# Patient Record
Sex: FEMALE | Race: WHITE | NOT HISPANIC OR LATINO | Employment: OTHER | URBAN - NONMETROPOLITAN AREA
[De-identification: names, ages, dates, MRNs, and addresses within clinical notes are randomized per-mention and may not be internally consistent; named-entity substitution may affect disease eponyms.]

---

## 2018-04-12 ENCOUNTER — HISTORICAL (OUTPATIENT)
Dept: ADMINISTRATIVE | Facility: HOSPITAL | Age: 63
End: 2018-04-12

## 2018-08-13 ENCOUNTER — HISTORICAL (OUTPATIENT)
Dept: ADMINISTRATIVE | Facility: HOSPITAL | Age: 63
End: 2018-08-13

## 2021-05-07 ENCOUNTER — HISTORICAL (OUTPATIENT)
Dept: ADMINISTRATIVE | Facility: HOSPITAL | Age: 66
End: 2021-05-07

## 2021-07-22 ENCOUNTER — HISTORICAL (OUTPATIENT)
Dept: ADMINISTRATIVE | Facility: HOSPITAL | Age: 66
End: 2021-07-22

## 2022-04-07 ENCOUNTER — HISTORICAL (OUTPATIENT)
Dept: ADMINISTRATIVE | Facility: HOSPITAL | Age: 67
End: 2022-04-07

## 2022-04-24 VITALS
DIASTOLIC BLOOD PRESSURE: 76 MMHG | HEIGHT: 63 IN | SYSTOLIC BLOOD PRESSURE: 138 MMHG | BODY MASS INDEX: 44.14 KG/M2 | WEIGHT: 249.13 LBS | OXYGEN SATURATION: 96 %

## 2022-07-27 ENCOUNTER — HISTORICAL (OUTPATIENT)
Dept: ADMINISTRATIVE | Facility: HOSPITAL | Age: 67
End: 2022-07-27

## 2022-12-30 ENCOUNTER — HISTORICAL (OUTPATIENT)
Dept: ADMINISTRATIVE | Facility: HOSPITAL | Age: 67
End: 2022-12-30

## 2023-07-25 ENCOUNTER — HOSPITAL ENCOUNTER (EMERGENCY)
Facility: HOSPITAL | Age: 68
Discharge: HOME | End: 2023-07-25
Attending: FAMILY MEDICINE
Payer: MEDICARE

## 2023-07-25 VITALS
HEART RATE: 63 BPM | RESPIRATION RATE: 18 BRPM | DIASTOLIC BLOOD PRESSURE: 85 MMHG | TEMPERATURE: 97 F | HEIGHT: 62 IN | BODY MASS INDEX: 45.56 KG/M2 | OXYGEN SATURATION: 98 % | SYSTOLIC BLOOD PRESSURE: 218 MMHG

## 2023-07-25 DIAGNOSIS — S90.02XA CONTUSION OF LEFT ANKLE, INITIAL ENCOUNTER: Primary | ICD-10-CM

## 2023-07-25 DIAGNOSIS — M25.572 ANKLE PAIN, LEFT: ICD-10-CM

## 2023-07-25 PROCEDURE — 63600175 PHARM REV CODE 636 W HCPCS: Performed by: FAMILY MEDICINE

## 2023-07-25 PROCEDURE — 99284 EMERGENCY DEPT VISIT MOD MDM: CPT

## 2023-07-25 PROCEDURE — 96372 THER/PROPH/DIAG INJ SC/IM: CPT | Performed by: FAMILY MEDICINE

## 2023-07-25 RX ORDER — KETOROLAC TROMETHAMINE 30 MG/ML
30 INJECTION, SOLUTION INTRAMUSCULAR; INTRAVENOUS
Status: COMPLETED | OUTPATIENT
Start: 2023-07-25 | End: 2023-07-25

## 2023-07-25 RX ORDER — MELOXICAM 7.5 MG/1
7.5 TABLET ORAL DAILY
Qty: 10 TABLET | Refills: 0 | Status: SHIPPED | OUTPATIENT
Start: 2023-07-25 | End: 2023-08-04

## 2023-07-25 RX ADMIN — KETOROLAC TROMETHAMINE 30 MG: 30 INJECTION, SOLUTION INTRAMUSCULAR; INTRAVENOUS at 09:07

## 2023-07-25 NOTE — ED NOTES
PT SAID SHE REMEMBERED THAT HER NP SAID THAT SHE HAS GOUT BUT SHE WAS NEVER GIVEN ANY MEDICATION FOR IT. SHE ALSO SAID THAT SHE SLEEPS IN A DAY BED AND MAYBE SHE HIT HER LEFT FOOT ON THE BED AT NIGHT WHILE SHE WAS SLEEPING. SHE DOES HAVE ARTHRITIS.

## 2023-07-25 NOTE — ED PROVIDER NOTES
Encounter Date: 7/25/2023       History     Chief Complaint   Patient presents with    Foot Swelling     TO ED WITH C/O LEFT FOOT ACHING/SWELLING X 3 DAYS     68-year-old white female presents to the emergency room complaining of left ankle pain and swelling for the last 3 days.  Patient's she is unsure if she twisted it or hit it on something but she has increased pain with ambulation.  Patient denies any other injuries or complaints.    The history is provided by the patient.   Review of patient's allergies indicates:   Allergen Reactions    Pcn [penicillins]     Sulfa (sulfonamide antibiotics) Nausea And Vomiting     Past Medical History:   Diagnosis Date    Arthritis     Hypertension     Other specified disorders of bladder      Past Surgical History:   Procedure Laterality Date    COLON SURGERY      HYSTERECTOMY       History reviewed. No pertinent family history.  Social History     Tobacco Use    Smoking status: Never    Smokeless tobacco: Never   Substance Use Topics    Alcohol use: Not Currently    Drug use: Never     Review of Systems   Musculoskeletal:         Ankle pain   All other systems reviewed and are negative.    Physical Exam     Initial Vitals [07/25/23 0740]   BP Pulse Resp Temp SpO2   (!) 186/77 60 20 96.8 °F (36 °C) 98 %      MAP       --         Physical Exam    Nursing note and vitals reviewed.  Constitutional:   Moderately obese white female alert in no acute distress.   HENT:   Head is atraumatic and normocephalic.  Oropharynx is clear moist mucous membranes.  Nose is clear with no discharge.   Neck:   Neck is supple with full range of motion with no cervical lymphadenopathy.   Cardiovascular:            Heart is regular rate and rhythm without murmur.   Pulmonary/Chest:   Lungs are clear to auscultation bilaterally.   Abdominal:   Abdomen positive bowel sounds throughout.  Abdomen is soft and nontender with no guarding or rebound.   Musculoskeletal:      Comments: Extremities with full  range of motion throughout except for left ankle which he is noted to have marked swelling and tenderness to lateral malleolar region.  Skin is intact.  No clubbing cyanosis or edema.     Neurological:   Patient is alert and oriented x3.  Bilateral upper and lower extremities with normal sensation and strength.   Skin:   Skin is warm and dry.   Psychiatric:   Patient's mood and affect are normal.  Patient's thought content behavior normal as well.       ED Course   Procedures  Labs Reviewed - No data to display       Imaging Results              X-Ray Ankle Complete Left (Final result)  Result time 07/25/23 09:07:30      Final result by Bobby Gilman III, MD (07/25/23 09:07:30)                   Impression:      1. Chronic changes are present as described above.  See above comments for full details.      Electronically signed by: Bobby Gilman  Date:    07/25/2023  Time:    09:07               Narrative:    EXAMINATION:  STUDY: XR ANKLE COMPLETE 3 VIEW LEFT    CLINICAL HISTORY AND TECHNIQUE:  Clarissa Pulido RT on 7/25/2023  8:18 AM    CLINICAL HX: ER PT    X 3 DAYS    C/O LEFT FOOT ACHING/SWELLING    PAST MEDICAL HX: ARTHRITIS, HTN, HYSTO    TECHNIQUE: 3V LEFT ANKLE    TECH: NN    COMPARISON:  11/25/2017    FINDINGS:  There is mild to moderate demineralization of the skeletal structures which is most pronounced within the distal left fibula.  Cortical fragmentation with sclerotic margins is noted adjacent to the medial malleolus and likely related to old trauma.  The left ankle mortise appears intact.  Moderate calcaneal spurring is noted at the site of insertion of the Achilles tendon and site of origin of the plantar fascia.  Mild-to-moderate degenerative changes are noted involving the intertarsal joints.  Subtle, linear vascular calcifications are noted within the posterior tibial artery and to a lesser extent the anterior tibial artery as can be seen with diabetes.  I see no definite fractures, dislocations, or  other significant abnormalities.                                       Medications   ketorolac injection 30 mg (has no administration in time range)     Medical Decision Making:   Initial Assessment:   Left ankle sprain  Differential Diagnosis:   Ankle contusion, gout, cellulitis  Clinical Tests:   Radiological Study: Ordered and Reviewed  ED Management:  We will go ahead and order x-ray of the left ankle.  Left ankle x-ray reveals no acute fractures.  Degenerative changes are noted.  I will go and put a Ace wrap on the patient's ankle and have patient follow up as needed.  We will go and give patient a shot of Toradol prior to discharge.  Patient will be discharged to home on Toradol as needed for pain and instructed to follow up with her PCP.                        Clinical Impression:   Final diagnoses:  [M25.572] Ankle pain, left  [S90.02XA] Contusion of left ankle, initial encounter (Primary)        ED Disposition Condition    Discharge Stable          ED Prescriptions       Medication Sig Dispense Start Date End Date Auth. Provider    meloxicam (MOBIC) 7.5 MG tablet Take 1 tablet (7.5 mg total) by mouth once daily. for 10 days 10 tablet 7/25/2023 8/4/2023 Roberth Chavez MD          Follow-up Information       Follow up With Specialties Details Why Contact Info    Follow up with Dr. Simmons.  In 1 week               Roberth Chavez MD  07/25/23 2261

## 2023-08-22 ENCOUNTER — HOSPITAL ENCOUNTER (OUTPATIENT)
Dept: RADIOLOGY | Facility: HOSPITAL | Age: 68
Discharge: HOME | End: 2023-08-22
Attending: NURSE PRACTITIONER
Payer: MEDICARE

## 2023-08-22 ENCOUNTER — CLINICAL SUPPORT (OUTPATIENT)
Dept: RESPIRATORY THERAPY | Facility: HOSPITAL | Age: 68
End: 2023-08-22
Attending: NURSE PRACTITIONER
Payer: MEDICARE

## 2023-08-22 DIAGNOSIS — R00.1 BRADYCARDIA: Primary | ICD-10-CM

## 2023-08-22 DIAGNOSIS — R00.1 BRADYCARDIA: ICD-10-CM

## 2023-08-22 DIAGNOSIS — I10 ESSENTIAL HYPERTENSION: ICD-10-CM

## 2023-08-22 PROCEDURE — 93010 ELECTROCARDIOGRAM REPORT: CPT | Mod: ,,, | Performed by: INTERNAL MEDICINE

## 2023-08-22 PROCEDURE — 71046 X-RAY EXAM CHEST 2 VIEWS: CPT | Mod: TC

## 2023-08-22 PROCEDURE — 93005 ELECTROCARDIOGRAM TRACING: CPT

## 2023-08-22 PROCEDURE — 93010 EKG 12-LEAD: ICD-10-PCS | Mod: ,,, | Performed by: INTERNAL MEDICINE

## 2023-09-14 ENCOUNTER — HOSPITAL ENCOUNTER (EMERGENCY)
Facility: HOSPITAL | Age: 68
Discharge: HOME | End: 2023-09-15
Payer: MEDICARE

## 2023-09-14 DIAGNOSIS — S09.90XA CLOSED HEAD INJURY, INITIAL ENCOUNTER: Primary | ICD-10-CM

## 2023-09-14 DIAGNOSIS — T14.90XA TRAUMA: ICD-10-CM

## 2023-09-14 DIAGNOSIS — S70.02XA CONTUSION OF LEFT HIP, INITIAL ENCOUNTER: ICD-10-CM

## 2023-09-14 DIAGNOSIS — S00.03XA CONTUSION OF OCCIPITAL REGION OF SCALP, INITIAL ENCOUNTER: ICD-10-CM

## 2023-09-14 DIAGNOSIS — S16.1XXA CERVICAL STRAIN, ACUTE, INITIAL ENCOUNTER: ICD-10-CM

## 2023-09-14 PROCEDURE — 25000003 PHARM REV CODE 250

## 2023-09-14 PROCEDURE — 96372 THER/PROPH/DIAG INJ SC/IM: CPT

## 2023-09-14 PROCEDURE — 63600175 PHARM REV CODE 636 W HCPCS

## 2023-09-14 PROCEDURE — 99285 EMERGENCY DEPT VISIT HI MDM: CPT | Mod: 25

## 2023-09-14 RX ORDER — LISINOPRIL 20 MG/1
20 TABLET ORAL
COMMUNITY
Start: 2023-08-22

## 2023-09-14 RX ORDER — ACETAMINOPHEN 500 MG
1000 TABLET ORAL
Status: COMPLETED | OUTPATIENT
Start: 2023-09-14 | End: 2023-09-14

## 2023-09-14 RX ORDER — HYDROCHLOROTHIAZIDE 12.5 MG/1
12.5 TABLET ORAL EVERY MORNING
COMMUNITY
Start: 2023-08-22

## 2023-09-14 RX ORDER — OXYBUTYNIN CHLORIDE 5 MG/1
5 TABLET ORAL 2 TIMES DAILY
COMMUNITY
Start: 2023-08-22

## 2023-09-14 RX ORDER — MELOXICAM 15 MG/1
15 TABLET ORAL
COMMUNITY
Start: 2023-08-22 | End: 2023-09-15 | Stop reason: ALTCHOICE

## 2023-09-14 RX ORDER — DICLOFENAC POTASSIUM 50 MG/1
50 TABLET, FILM COATED ORAL 2 TIMES DAILY PRN
COMMUNITY

## 2023-09-14 RX ORDER — AMLODIPINE BESYLATE 5 MG/1
5 TABLET ORAL
COMMUNITY
Start: 2023-08-22

## 2023-09-14 RX ORDER — KETOROLAC TROMETHAMINE 30 MG/ML
30 INJECTION, SOLUTION INTRAMUSCULAR; INTRAVENOUS
Status: COMPLETED | OUTPATIENT
Start: 2023-09-14 | End: 2023-09-14

## 2023-09-14 RX ADMIN — ACETAMINOPHEN 1000 MG: 500 TABLET, FILM COATED ORAL at 11:09

## 2023-09-14 RX ADMIN — KETOROLAC TROMETHAMINE 30 MG: 30 INJECTION, SOLUTION INTRAMUSCULAR at 11:09

## 2023-09-15 VITALS
SYSTOLIC BLOOD PRESSURE: 172 MMHG | WEIGHT: 232 LBS | OXYGEN SATURATION: 97 % | BODY MASS INDEX: 42.69 KG/M2 | TEMPERATURE: 98 F | DIASTOLIC BLOOD PRESSURE: 73 MMHG | HEIGHT: 62 IN | HEART RATE: 70 BPM | RESPIRATION RATE: 20 BRPM

## 2023-09-15 RX ORDER — NAPROXEN 500 MG/1
500 TABLET ORAL 2 TIMES DAILY
Qty: 20 TABLET | Refills: 0 | Status: SHIPPED | OUTPATIENT
Start: 2023-09-15 | End: 2024-01-31 | Stop reason: ALTCHOICE

## 2023-09-15 RX ORDER — HYDROCODONE BITARTRATE AND ACETAMINOPHEN 5; 325 MG/1; MG/1
1 TABLET ORAL EVERY 6 HOURS PRN
Qty: 12 TABLET | Refills: 0 | OUTPATIENT
Start: 2023-09-15 | End: 2024-03-10

## 2023-09-15 NOTE — ED PROVIDER NOTES
"Encounter Date: 9/14/2023       History     Chief Complaint   Patient presents with    Fall     PT STATES FALLING OUT OF BATHTUB AND HITTING HEAD ON "MILK CRATE" AND LANDING ON LEFT HIP. PT STATES HEAD, NECK AND LEFT HIP PAIN. DENIES ANY LOC OR BEING ON BLOOD THINNERS.     68-year-old female presents complaining of slip and fall while getting out of her bathtub just prior to arrival.  She struck her left occiput on a milk crate, and her left hip on the edge of the toe.  She is complaining of pain in these areas, as well as pain in her neck.  Denies any loss of consciousness.  She drove herself to the emergency room.    The history is provided by the patient.     Review of patient's allergies indicates:   Allergen Reactions    Pcn [penicillins]     Sulfa (sulfonamide antibiotics) Nausea And Vomiting     Past Medical History:   Diagnosis Date    Arthritis     Hypertension     Other specified disorders of bladder      Past Surgical History:   Procedure Laterality Date    COLON SURGERY      HYSTERECTOMY       History reviewed. No pertinent family history.  Social History     Tobacco Use    Smoking status: Never    Smokeless tobacco: Never   Substance Use Topics    Alcohol use: Not Currently    Drug use: Never     Review of Systems   Constitutional:  Negative for fever.   HENT:  Negative for sore throat.    Respiratory:  Negative for shortness of breath.    Cardiovascular:  Negative for chest pain.   Gastrointestinal:  Negative for nausea.   Genitourinary:  Negative for dysuria.   Musculoskeletal:  Positive for neck pain. Negative for back pain.        Pain in left hip.   Skin:  Negative for rash.   Neurological:  Positive for headaches. Negative for weakness.   Hematological:  Does not bruise/bleed easily.   All other systems reviewed and are negative.      Physical Exam     Initial Vitals [09/14/23 2255]   BP Pulse Resp Temp SpO2   (!) 199/70 67 20 98.1 °F (36.7 °C) 98 %      MAP       --         Physical " Exam    Nursing note and vitals reviewed.  Constitutional: Vital signs are normal. She appears well-developed and well-nourished. She is cooperative.   HENT:   Right Ear: External ear normal.   Left Ear: External ear normal.   Nose: Nose normal.   She has a hematoma over her left occiput   Eyes: Conjunctivae, EOM and lids are normal. Pupils are equal, round, and reactive to light.   Neck: Trachea normal. Neck supple.   She is some mild posterior cervical tenderness, as well as pain on range of motion testing.   Cardiovascular:  Normal rate, regular rhythm, normal heart sounds and intact distal pulses.           Pulmonary/Chest: Breath sounds normal.   Abdominal: Abdomen is soft. Bowel sounds are normal.   Musculoskeletal:         General: Tenderness present. Normal range of motion.      Cervical back: Normal and neck supple.      Lumbar back: Normal.      Comments: There is a mild left hip tenderness. The hip has full range of motion without crepitus.     Neurological: She is alert and oriented to person, place, and time. She has normal strength. Coordination normal. GCS score is 15. GCS eye subscore is 4. GCS verbal subscore is 5. GCS motor subscore is 6.   Skin: Skin is warm, dry and intact. Capillary refill takes less than 2 seconds.   Psychiatric: She has a normal mood and affect. Her speech is normal and behavior is normal. Judgment and thought content normal. Cognition and memory are normal.         ED Course   Procedures  Labs Reviewed - No data to display       Imaging Results              X-Ray Hip 2 or 3 views Left (with Pelvis when performed) (Preliminary result)  Result time 09/15/23 00:02:13      Wet Read by Urban Martin MD (09/15/23 00:02:13, [facility], Emergency Medicine)    No fracture, normal alignment                                     CT Cervical Spine Without Contrast (Final result)  Result time 09/14/23 23:55:14      Final result by Cecil Romo MD  (09/14/23 23:55:14)                   Impression:      As above    All CT scans at [this location] are performed using dose modulation techniques as appropriate to a performed exam including the following: automated exposure control; adjustment of the mA and/or kV according to patient size (this includes techniques or standardized protocols for targeted exams where dose is matched to indication / reason for exam; i.e. extremities or head); use of iterative reconstruction technique.              Finalized on: 9/14/2023 11:55 PM By:  Cecil Romo MD, JD PhD  [facility]# [id number]      2023-09-14 23:57:[id number]    [facility]               Narrative:    EXAM:CT CERVICAL SPINE WITHOUT CONTRAST    INDICATION:  Pain    COMPARISON: None    TECHNIQUE: Multiple axial images were obtained without intravenous contrast.  Multiplanar reformats were performed and interpreted.    FINDINGS:  Moderate multilevel degenerative disc disease.  There is a deformity involving the right lamina of C5,, see axial image 19, series 8 of uncertain chronicity.  Correlate clinically.  Otherwise no acute fracture or dislocation.  No prevertebral soft tissue swelling.                                           CT Head Without Contrast (Final result)  Result time 09/14/23 23:50:53      Final result by Cecil Romo MD (09/14/23 23:50:53)                   Impression:        No intracranial hemorrhage mass effect or midline shift    Atrophy and chronic white matter changes      All CT scans at [this location] are performed using dose modulation techniques as appropriate to a performed exam including the following: automated exposure control; adjustment of the mA and/or kV according to patient size (this includes techniques or standardized protocols for targeted exams where dose is matched to indication / reason for exam; i.e. extremities or head); use of iterative reconstruction technique.          Finalized on: 9/14/2023 11:50 PM By:  Cecil Romo MD, JD PhD  [facility]#  [id number]      2023-09-14 23:52:57.392    [facility]               Narrative:    EXAM:  CT HEAD WITHOUT CONTRAST    CLINICAL HISTORY: Pain    TECHNIQUE:  5-mm axial images were performed through the head without intravenous contrast.    COMPARISON: None    FINDINGS:      No hydrocephalus, midline shift, mass effect, or acute intracranial hemorrhage.    Atrophy and chronic white matter changes.    No extra-axial fluid or hemorrhage.      Posterior fossa is unremarkable.    The skull and orbits are intact.    The visualized paranasal sinuses and mastoid air cells are clear.                                         Medications   ketorolac injection 30 mg (30 mg Intramuscular Given 9/14/23 2317)   acetaminophen tablet 1,000 mg (1,000 mg Oral Given 9/14/23 2317)     Medical Decision Making  Status post slip and fall.  Left occipital contusion, neck tenderness, left hip pain.  Toradol IM, Tylenol p.o.  CT head, CT C-spine, left hip x-ray    Amount and/or Complexity of Data Reviewed  Radiology: ordered and independent interpretation performed. Decision-making details documented in ED Course.    Risk  OTC drugs.  Prescription drug management.               ED Course as of 09/15/23 0005   Fri Sep 15, 2023   0001 X-Ray Hip 2 or 3 views Left (with Pelvis when performed) [TM]   0002 CT Cervical Spine Without Contrast  Spondylosis, no acute fracture [TM]   0002 CT Head Without Contrast  No acute intracranial abnormality [TM]      ED Course User Index  [TM] Urban Martin MD                    Clinical Impression:   Final diagnoses:  [T14.90XA] Trauma  [S09.90XA] Closed head injury, initial encounter (Primary)  [S00.03XA] Contusion of occipital region of scalp, initial encounter  [S16.1XXA] Cervical strain, acute, initial encounter  [S70.02XA] Contusion of left hip, initial encounter        ED Disposition Condition    Discharge Stable          ED Prescriptions       Medication Sig Dispense Start Date End Date Auth. Provider     naproxen (NAPROSYN) 500 MG tablet Take 1 tablet (500 mg total) by mouth 2 (two) times daily. 20 tablet 9/15/2023 -- Urban Martin MD    HYDROcodone-acetaminophen (NORCO) 5-325 mg per tablet Take 1 tablet by mouth every 6 (six) hours as needed for Pain. 12 tablet 9/15/2023 -- Urban Martin MD          Follow-up Information       Follow up With Specialties Details Why Contact Info    [facility], Gynecology, Internal Medicine Call today  [address] [place] [address]  [phone]               Urban Martin MD  09/15/23 0005

## 2024-01-23 ENCOUNTER — HOSPITAL ENCOUNTER (EMERGENCY)
Facility: HOSPITAL | Age: 69
Discharge: HOME | End: 2024-01-23
Payer: MEDICARE

## 2024-01-23 VITALS
HEIGHT: 62 IN | WEIGHT: 224 LBS | BODY MASS INDEX: 41.22 KG/M2 | SYSTOLIC BLOOD PRESSURE: 155 MMHG | RESPIRATION RATE: 18 BRPM | TEMPERATURE: 98 F | DIASTOLIC BLOOD PRESSURE: 75 MMHG | HEART RATE: 60 BPM | OXYGEN SATURATION: 98 %

## 2024-01-23 DIAGNOSIS — J10.1 INFLUENZA B: Primary | ICD-10-CM

## 2024-01-23 DIAGNOSIS — R05.9 COUGH: ICD-10-CM

## 2024-01-23 LAB
INFLUENZA A (OHS): NEGATIVE
INFLUENZA B (OHS): POSITIVE
RAPID GROUP A STREP (OHS): NEGATIVE
SARS-COV-2 RDRP RESP QL NAA+PROBE: NEGATIVE

## 2024-01-23 PROCEDURE — 87400 INFLUENZA A/B EACH AG IA: CPT | Performed by: NURSE PRACTITIONER

## 2024-01-23 PROCEDURE — 87635 SARS-COV-2 COVID-19 AMP PRB: CPT | Performed by: NURSE PRACTITIONER

## 2024-01-23 PROCEDURE — 87651 STREP A DNA AMP PROBE: CPT | Performed by: NURSE PRACTITIONER

## 2024-01-23 PROCEDURE — 99283 EMERGENCY DEPT VISIT LOW MDM: CPT | Mod: 25

## 2024-01-23 RX ORDER — ISOSORBIDE MONONITRATE 30 MG/1
30 TABLET, EXTENDED RELEASE ORAL DAILY
COMMUNITY
Start: 2023-11-27

## 2024-01-23 RX ORDER — OSELTAMIVIR PHOSPHATE 75 MG/1
75 CAPSULE ORAL 2 TIMES DAILY
Qty: 10 CAPSULE | Refills: 0 | Status: SHIPPED | OUTPATIENT
Start: 2024-01-23 | End: 2024-01-28

## 2024-01-23 RX ORDER — CLOPIDOGREL BISULFATE 75 MG/1
75 TABLET ORAL DAILY
COMMUNITY

## 2024-01-23 NOTE — ED PROVIDER NOTES
"Encounter Date: 1/23/2024       History     Chief Complaint   Patient presents with    Cough     Pt reports onset of cough and sore throat since last Sat. Denies any relief w/ OTC meds. Also reports painful urination x 2 days. States "I think I have a UTI." Denies any recent ABX use.     Sore Throat    Dysuria       Pt reports onset of cough and sore throat since last Sat. Denies any relief w/ OTC meds.            Review of patient's allergies indicates:   Allergen Reactions    Pcn [penicillins]     Beta-blockers (beta-adrenergic blocking agts) Nausea And Vomiting    Sulfa (sulfonamide antibiotics) Nausea And Vomiting     Past Medical History:   Diagnosis Date    Arthritis     Hypertension     Other specified disorders of bladder      Past Surgical History:   Procedure Laterality Date    COLON SURGERY      HYSTERECTOMY       History reviewed. No pertinent family history.  Social History     Tobacco Use    Smoking status: Never    Smokeless tobacco: Never   Substance Use Topics    Alcohol use: Not Currently    Drug use: Never     Review of Systems   HENT:  Positive for sore throat.    Respiratory:  Positive for cough.    All other systems reviewed and are negative.      Physical Exam     Initial Vitals [01/23/24 1508]   BP Pulse Resp Temp SpO2   (!) 155/75 60 18 98.4 °F (36.9 °C) 98 %      MAP       --         Physical Exam    Nursing note and vitals reviewed.  Constitutional: She appears well-developed and well-nourished.   HENT:   Head: Normocephalic and atraumatic.   Eyes: Pupils are equal, round, and reactive to light.   Neck:   Normal range of motion.  Cardiovascular:  Normal rate, regular rhythm and normal heart sounds.           Pulmonary/Chest: Breath sounds normal.   Musculoskeletal:         General: Normal range of motion.      Cervical back: Normal range of motion.     Neurological: She is alert and oriented to person, place, and time.   Skin: Skin is warm and dry. Capillary refill takes less than 2 " seconds.   Psychiatric: She has a normal mood and affect. Her behavior is normal. Judgment and thought content normal.         ED Course   Procedures  Labs Reviewed   RAPID INFLUENZA A/B - Abnormal; Notable for the following components:       Result Value    Influenza B Positive (*)     All other components within normal limits   THROAT SCREEN, RAPID STREP - Normal   SARS-COV-2 RNA AMPLIFICATION, QUAL - Normal          Imaging Results              X-Ray Chest 1 View (Final result)  Result time 01/23/24 15:24:19      Final result by Sven Gómez MD (01/23/24 15:24:19)                   Impression:      No acute findings in the chest.      Electronically signed by: Sven Gómez MD  Date:    01/23/2024  Time:    15:24               Narrative:    EXAMINATION:  XR CHEST 1 VIEW    CLINICAL HISTORY:  Cough, unspecified    TECHNIQUE:  Single frontal view of the chest was performed.    COMPARISON:  PA and lateral chest radiograph, 08/22/2023.    FINDINGS:  No consolidation, pleural effusion or pneumothorax.    Cardiomediastinal silhouette is within normal limits for size.    No acute osseous abnormality.                                       Medications - No data to display  Medical Decision Making  Problems Addressed:  Influenza B: acute illness or injury    Amount and/or Complexity of Data Reviewed  Radiology: ordered.    Risk  Prescription drug management.                                      Clinical Impression:  Final diagnoses:  [J10.1] Influenza B (Primary)          ED Disposition Condition    Discharge Stable          ED Prescriptions       Medication Sig Dispense Start Date End Date Auth. Provider    oseltamivir (TAMIFLU) 75 MG capsule Take 1 capsule (75 mg total) by mouth 2 (two) times daily. for 5 days 10 capsule 1/23/2024 1/28/2024 Sindhu Gerard FNP          Follow-up Information       Follow up With Specialties Details Why Contact Info    Neymar Brasher NP Family Medicine Call  As needed [phone] [address]  [phone]               Sindhu Gerard, [facility]  01/23/24 1035

## 2024-01-31 ENCOUNTER — HOSPITAL ENCOUNTER (EMERGENCY)
Facility: HOSPITAL | Age: 69
Discharge: HOME | End: 2024-01-31
Attending: EMERGENCY MEDICINE
Payer: MEDICARE

## 2024-01-31 VITALS
TEMPERATURE: 99 F | OXYGEN SATURATION: 97 % | WEIGHT: 224 LBS | HEART RATE: 69 BPM | DIASTOLIC BLOOD PRESSURE: 65 MMHG | HEIGHT: 63 IN | SYSTOLIC BLOOD PRESSURE: 131 MMHG | RESPIRATION RATE: 19 BRPM | BODY MASS INDEX: 39.69 KG/M2

## 2024-01-31 DIAGNOSIS — J06.9 VIRAL URI WITH COUGH: Primary | ICD-10-CM

## 2024-01-31 DIAGNOSIS — J43.8 OTHER EMPHYSEMA: ICD-10-CM

## 2024-01-31 DIAGNOSIS — R05.9 COUGH: ICD-10-CM

## 2024-01-31 DIAGNOSIS — M25.572 ACUTE LEFT ANKLE PAIN: ICD-10-CM

## 2024-01-31 PROCEDURE — 25000242 PHARM REV CODE 250 ALT 637 W/ HCPCS: Performed by: NURSE PRACTITIONER

## 2024-01-31 PROCEDURE — 63600175 PHARM REV CODE 636 W HCPCS: Performed by: NURSE PRACTITIONER

## 2024-01-31 PROCEDURE — 96372 THER/PROPH/DIAG INJ SC/IM: CPT | Performed by: NURSE PRACTITIONER

## 2024-01-31 PROCEDURE — 99284 EMERGENCY DEPT VISIT MOD MDM: CPT | Mod: 25

## 2024-01-31 PROCEDURE — 94640 AIRWAY INHALATION TREATMENT: CPT

## 2024-01-31 RX ORDER — IPRATROPIUM BROMIDE AND ALBUTEROL SULFATE 2.5; .5 MG/3ML; MG/3ML
3 SOLUTION RESPIRATORY (INHALATION) EVERY 6 HOURS PRN
Qty: 75 ML | Refills: 0 | Status: SHIPPED | OUTPATIENT
Start: 2024-01-31 | End: 2024-03-01

## 2024-01-31 RX ORDER — BENZONATATE 200 MG/1
200 CAPSULE ORAL 3 TIMES DAILY PRN
Qty: 30 CAPSULE | Refills: 0 | Status: SHIPPED | OUTPATIENT
Start: 2024-01-31 | End: 2024-02-10

## 2024-01-31 RX ORDER — NEBULIZER AND COMPRESSOR
1 EACH MISCELLANEOUS
Qty: 1 EACH | Refills: 0 | Status: SHIPPED | OUTPATIENT
Start: 2024-01-31 | End: 2024-01-31 | Stop reason: CLARIF

## 2024-01-31 RX ORDER — INDOMETHACIN 50 MG/1
50 CAPSULE ORAL 2 TIMES DAILY WITH MEALS
Qty: 10 CAPSULE | Refills: 0 | Status: SHIPPED | OUTPATIENT
Start: 2024-01-31 | End: 2024-02-05

## 2024-01-31 RX ORDER — IPRATROPIUM BROMIDE AND ALBUTEROL SULFATE 2.5; .5 MG/3ML; MG/3ML
3 SOLUTION RESPIRATORY (INHALATION)
Status: COMPLETED | OUTPATIENT
Start: 2024-01-31 | End: 2024-01-31

## 2024-01-31 RX ORDER — PREDNISONE 10 MG/1
10 TABLET ORAL 2 TIMES DAILY
Qty: 8 TABLET | Refills: 0 | Status: SHIPPED | OUTPATIENT
Start: 2024-01-31 | End: 2024-02-04

## 2024-01-31 RX ORDER — BUDESONIDE AND FORMOTEROL FUMARATE DIHYDRATE 160; 4.5 UG/1; UG/1
2 AEROSOL RESPIRATORY (INHALATION) EVERY 12 HOURS
Qty: 10.2 G | Refills: 0 | Status: SHIPPED | OUTPATIENT
Start: 2024-01-31 | End: 2025-01-30

## 2024-01-31 RX ORDER — DEXAMETHASONE SODIUM PHOSPHATE 4 MG/ML
8 INJECTION, SOLUTION INTRA-ARTICULAR; INTRALESIONAL; INTRAMUSCULAR; INTRAVENOUS; SOFT TISSUE
Status: COMPLETED | OUTPATIENT
Start: 2024-01-31 | End: 2024-01-31

## 2024-01-31 RX ADMIN — IPRATROPIUM BROMIDE AND ALBUTEROL SULFATE 3 ML: .5; 3 SOLUTION RESPIRATORY (INHALATION) at 02:01

## 2024-01-31 RX ADMIN — DEXAMETHASONE SODIUM PHOSPHATE 8 MG: 4 INJECTION, SOLUTION INTRA-ARTICULAR; INTRALESIONAL; INTRAMUSCULAR; INTRAVENOUS; SOFT TISSUE at 02:01

## 2024-01-31 NOTE — ED PROVIDER NOTES
Encounter Date: 1/31/2024       History     Chief Complaint   Patient presents with    Cough     Patient reports shortness of breath on exertion with productive cough and weakness. Patient reports left leg weakness. Patient reports positive for the Flu on 1/23.     Pt c/o on going cough since been dx with the flu, otc cough meds without any relief. Has had some episodes of SOB.Has a history of Emphysema, out of inhaler and nebulizer is broken. Cough occasional productive, no fever or CP, no N/VD.  Also c/o left ankle pain for couple days, no injury or trauma. Difficulty walking on it.  Has a history of gout.     The history is provided by the patient. No  was used.     Review of patient's allergies indicates:   Allergen Reactions    Pcn [penicillins]     Beta-blockers (beta-adrenergic blocking agts) Nausea And Vomiting    Sulfa (sulfonamide antibiotics) Nausea And Vomiting     Past Medical History:   Diagnosis Date    Arthritis     Hypertension     Other specified disorders of bladder      Past Surgical History:   Procedure Laterality Date    COLON SURGERY      HYSTERECTOMY       History reviewed. No pertinent family history.  Social History     Tobacco Use    Smoking status: Never    Smokeless tobacco: Never   Substance Use Topics    Alcohol use: Not Currently    Drug use: Never     Review of Systems   Constitutional:  Positive for activity change. Negative for appetite change, chills, fatigue and fever.   HENT:  Positive for congestion. Negative for ear discharge, ear pain, postnasal drip, rhinorrhea, sinus pain, sore throat and trouble swallowing.    Respiratory:  Positive for cough, shortness of breath and wheezing.    Cardiovascular:  Negative for chest pain, palpitations and leg swelling.   Gastrointestinal:  Negative for diarrhea, nausea and vomiting.   Musculoskeletal:  Positive for arthralgias, gait problem and joint swelling. Negative for myalgias.   Skin: Negative.    Neurological:   Positive for weakness. Negative for dizziness and light-headedness.   Hematological:  Negative for adenopathy. Does not bruise/bleed easily.   Psychiatric/Behavioral:  Negative for confusion.        Physical Exam     Initial Vitals   BP Pulse Resp Temp SpO2   01/31/24 1401 01/31/24 1400 01/31/24 1401 01/31/24 1400 01/31/24 1401   132/61 71 20 98.6 °F (37 °C) 97 %      MAP       --                Physical Exam    Nursing note and vitals reviewed.  Constitutional: Vital signs are normal. She appears well-developed and well-nourished.   HENT:   Head: Normocephalic.   Right Ear: Tympanic membrane, external ear and ear canal normal.   Left Ear: Tympanic membrane, external ear and ear canal normal.   Nose: Nose normal. No rhinorrhea.   Mouth/Throat: Oropharynx is clear and moist.   Eyes: Conjunctivae and lids are normal.   Neck: Neck supple.   Normal range of motion.   Full passive range of motion without pain.     Cardiovascular:  Normal rate, regular rhythm and normal heart sounds.           Pulmonary/Chest: Effort normal. No accessory muscle usage. No tachypnea. No respiratory distress. She has wheezes. She has rhonchi.   Musculoskeletal:      Cervical back: Full passive range of motion without pain, normal range of motion and neck supple.     Lymphadenopathy:     She has no cervical adenopathy.   Neurological: She is alert and oriented to person, place, and time.   Skin: Skin is warm, dry and intact. Capillary refill takes less than 2 seconds.   Psychiatric: She has a normal mood and affect. Her speech is normal and behavior is normal.         ED Course   Procedures  Labs Reviewed - No data to display       Imaging Results              X-Ray Chest 1 View (Final result)  Result time 01/31/24 15:03:58      Final result by Bobby Gilman III, MD (01/31/24 15:03:58)                   Impression:      1. I see no lobar or segmental infiltrates or other significant abnormalities.      Electronically signed by: Bobby  Kalina  Date:    01/31/2024  Time:    15:03               Narrative:    EXAMINATION:  STUDY: XR CHEST 1 VIEW    CLINICAL HISTORY AND TECHNIQUE:  Cough    COMPARISON:  01/23/2024    FINDINGS:  The cardiac, hilar, and mediastinal contours appear unremarkable.I see no lobar or segmental infiltrates.No significant pleural effusions are noted.Moderate degenerative changes are noted involving the thoracic spine.                                       Medications   albuterol-ipratropium 2.5 mg-0.5 mg/3 mL nebulizer solution 3 mL (3 mLs Nebulization Given 1/31/24 3771)   dexAMETHasone injection 8 mg (8 mg Intramuscular Given 1/31/24 1425)     Medical Decision Making  Post treatment: improved lung sounds, decrease wheezing, scattered Ronchi. Pt states feels better post neb. Will discharge home with instructions, pt stated she understood.                                       Clinical Impression:  Final diagnoses:  [J06.9] Viral URI with cough - Influenza B (Primary)  [J43.8] Other emphysema  [M25.572] Acute left ankle pain - GOut  [R05.9] Cough          ED Disposition Condition    Discharge Stable          ED Prescriptions       Medication Sig Dispense Start Date End Date Auth. Provider    budesonide-formoterol 160-4.5 mcg (SYMBICORT) 160-4.5 mcg/actuation HFAA Inhale 2 puffs into the lungs every 12 (twelve) hours. Controller 10.2 g 1/31/2024 1/30/2025 Neli Roy FNP    predniSONE (DELTASONE) 10 MG tablet Take 1 tablet (10 mg total) by mouth 2 (two) times daily. for 4 days 8 tablet 1/31/2024 2/4/2024 Neli Roy FNP    indomethacin (INDOCIN) 50 MG capsule Take 1 capsule (50 mg total) by mouth 2 (two) times daily with meals. for 5 days 10 capsule 1/31/2024 2/5/2024 Neli Roy FNP    benzonatate (TESSALON) 200 MG capsule Take 1 capsule (200 mg total) by mouth 3 (three) times daily as needed for Cough. 30 capsule 1/31/2024 2/10/2024 Neli Roy FNP    albuterol-ipratropium (DUO-NEB) 2.5 mg-0.5 mg/3  mL nebulizer solution Take 3 mLs by nebulization every 6 (six) hours as needed for Wheezing or Shortness of Breath (while sick). Rescue 75 mL 1/31/2024 3/1/2024 Neli Roy FNP    nebulizer and compressor Thalia  (Status: Discontinued) 1 applicator by Misc.(Non-Drug; Combo Route) route every 4 to 6 hours as needed (SOB, wheezing). 1 each 1/31/2024 1/31/2024 Neli Roy FNP          Follow-up Information       Follow up With Specialties Details Why Contact Info    [facility]  If symptoms worsen, As needed [id number]  [facility] [id number] [phone]    Neymar Brasher NP Family Medicine In 3 days  [address] [place] [address]  [phone]               Neli Roy FNP  01/31/24 2188

## 2024-01-31 NOTE — DISCHARGE INSTRUCTIONS
Take and use all meds as directed. Schedule a follow up appt with PCP. Increase fluids, rest    If SOB increases or just not getting better seek care.

## 2024-02-25 ENCOUNTER — HOSPITAL ENCOUNTER (EMERGENCY)
Facility: HOSPITAL | Age: 69
Discharge: HOME | End: 2024-02-25
Attending: FAMILY MEDICINE
Payer: MEDICARE

## 2024-02-25 VITALS
WEIGHT: 225 LBS | OXYGEN SATURATION: 95 % | DIASTOLIC BLOOD PRESSURE: 67 MMHG | HEIGHT: 62 IN | RESPIRATION RATE: 16 BRPM | TEMPERATURE: 98 F | BODY MASS INDEX: 41.41 KG/M2 | SYSTOLIC BLOOD PRESSURE: 155 MMHG | HEART RATE: 73 BPM

## 2024-02-25 DIAGNOSIS — R05.9 COUGH: ICD-10-CM

## 2024-02-25 DIAGNOSIS — J20.9 ACUTE BRONCHITIS, UNSPECIFIED ORGANISM: Primary | ICD-10-CM

## 2024-02-25 LAB
INFLUENZA A (OHS): NEGATIVE
INFLUENZA B (OHS): NEGATIVE
RAPID GROUP A STREP (OHS): NEGATIVE
SARS-COV-2 RDRP RESP QL NAA+PROBE: NEGATIVE

## 2024-02-25 PROCEDURE — 99284 EMERGENCY DEPT VISIT MOD MDM: CPT | Mod: 25

## 2024-02-25 PROCEDURE — 87400 INFLUENZA A/B EACH AG IA: CPT | Performed by: NURSE PRACTITIONER

## 2024-02-25 PROCEDURE — 63600175 PHARM REV CODE 636 W HCPCS: Performed by: NURSE PRACTITIONER

## 2024-02-25 PROCEDURE — 63700000 PHARM REV CODE 250 ALT 637 W/O HCPCS: Performed by: NURSE PRACTITIONER

## 2024-02-25 PROCEDURE — 87635 SARS-COV-2 COVID-19 AMP PRB: CPT | Performed by: NURSE PRACTITIONER

## 2024-02-25 PROCEDURE — 25000003 PHARM REV CODE 250: Performed by: NURSE PRACTITIONER

## 2024-02-25 PROCEDURE — 87651 STREP A DNA AMP PROBE: CPT | Performed by: NURSE PRACTITIONER

## 2024-02-25 RX ORDER — AZITHROMYCIN 250 MG/1
250 TABLET, FILM COATED ORAL
Status: COMPLETED | OUTPATIENT
Start: 2024-02-25 | End: 2024-02-25

## 2024-02-25 RX ORDER — PROMETHAZINE HYDROCHLORIDE AND DEXTROMETHORPHAN HYDROBROMIDE 6.25; 15 MG/5ML; MG/5ML
5 SYRUP ORAL EVERY 4 HOURS PRN
Qty: 50 ML | Refills: 0 | Status: SHIPPED | OUTPATIENT
Start: 2024-02-25 | End: 2024-03-06

## 2024-02-25 RX ORDER — BENZONATATE 200 MG/1
200 CAPSULE ORAL 3 TIMES DAILY PRN
Qty: 30 CAPSULE | Refills: 0 | Status: SHIPPED | OUTPATIENT
Start: 2024-02-25 | End: 2024-02-25 | Stop reason: CLARIF

## 2024-02-25 RX ORDER — PREDNISONE 20 MG/1
40 TABLET ORAL
Status: COMPLETED | OUTPATIENT
Start: 2024-02-25 | End: 2024-02-25

## 2024-02-25 RX ORDER — PREDNISONE 20 MG/1
20 TABLET ORAL 2 TIMES DAILY
Qty: 10 TABLET | Refills: 0 | Status: SHIPPED | OUTPATIENT
Start: 2024-02-25 | End: 2024-03-01

## 2024-02-25 RX ORDER — AZITHROMYCIN 250 MG/1
TABLET, FILM COATED ORAL
Qty: 6 TABLET | Refills: 0 | Status: SHIPPED | OUTPATIENT
Start: 2024-02-25 | End: 2024-03-01

## 2024-02-25 RX ORDER — DEXAMETHASONE SODIUM PHOSPHATE 4 MG/ML
12 INJECTION, SOLUTION INTRA-ARTICULAR; INTRALESIONAL; INTRAMUSCULAR; INTRAVENOUS; SOFT TISSUE
Status: DISCONTINUED | OUTPATIENT
Start: 2024-02-25 | End: 2024-02-25

## 2024-02-25 RX ORDER — BENZONATATE 100 MG/1
200 CAPSULE ORAL ONCE
Status: COMPLETED | OUTPATIENT
Start: 2024-02-25 | End: 2024-02-25

## 2024-02-25 RX ORDER — AZITHROMYCIN 250 MG/1
TABLET, FILM COATED ORAL
Qty: 6 TABLET | Refills: 0 | Status: SHIPPED | OUTPATIENT
Start: 2024-02-25 | End: 2024-02-25

## 2024-02-25 RX ADMIN — BENZONATATE 200 MG: 100 CAPSULE ORAL at 09:02

## 2024-02-25 RX ADMIN — PREDNISONE 40 MG: 20 TABLET ORAL at 10:02

## 2024-02-25 RX ADMIN — AZITHROMYCIN DIHYDRATE 250 MG: 250 TABLET ORAL at 10:02

## 2024-02-26 NOTE — ED PROVIDER NOTES
Encounter Date: 2/25/2024       History     Chief Complaint   Patient presents with    Cough     Cough x 2 months, this is 3rd visit to ER - has not seen PCP - productive green phlem - post tussive emesis last night - diarrhea x 3 today - no s/s resp distress       69 yrs old female presents with cough, chest congestion x 2 weeks. Patient reports she cough so much it causes her to vomit when she gets coughing spells. Patient reports she has taken 2 breathing treatment today without much help and will take another when she gets home tonight. Reports the breathing treatments make her cough up sputum more when she takes them.     The history is provided by the patient.     Review of patient's allergies indicates:   Allergen Reactions    Kassandra [amlodipine-olmesartan]     Pcn [penicillins]     Beta-blockers (beta-adrenergic blocking agts) Nausea And Vomiting    Sulfa (sulfonamide antibiotics) Nausea And Vomiting     Past Medical History:   Diagnosis Date    Arthritis     Hypertension     Other specified disorders of bladder      Past Surgical History:   Procedure Laterality Date    COLON SURGERY      HYSTERECTOMY      TONSILLECTOMY       History reviewed. No pertinent family history.  Social History     Tobacco Use    Smoking status: Never    Smokeless tobacco: Never   Substance Use Topics    Alcohol use: Not Currently    Drug use: Never     Review of Systems   HENT:  Positive for congestion, postnasal drip and rhinorrhea.    Respiratory:  Positive for cough. Negative for apnea, choking, chest tightness, shortness of breath, wheezing and stridor.    Cardiovascular:  Negative for chest pain, palpitations and leg swelling.   Gastrointestinal:  Positive for vomiting. Negative for abdominal pain, constipation, diarrhea and nausea.   Neurological:  Negative for dizziness, tremors, seizures, syncope, facial asymmetry, speech difficulty, weakness, light-headedness, numbness and headaches.   All other systems reviewed and are  negative.      Physical Exam     Initial Vitals [02/25/24 2120]   BP Pulse Resp Temp SpO2   (!) 155/67 73 16 98.3 °F (36.8 °C) 95 %      MAP       --         Physical Exam    Nursing note and vitals reviewed.  Constitutional: She appears well-developed and well-nourished.   HENT:   Head: Normocephalic and atraumatic.   Right Ear: External ear normal.   Left Ear: External ear normal.   Nose: Nose normal.   Mouth/Throat: Oropharynx is clear and moist.   Eyes: Conjunctivae and EOM are normal.   Neck: Neck supple.   Normal range of motion.  Cardiovascular:  Normal rate, regular rhythm, normal heart sounds and intact distal pulses.           Pulmonary/Chest: Effort normal. She has no decreased breath sounds. She has no wheezes. She has rhonchi in the right upper field, the right middle field and the right lower field. She has no rales.   Abdominal: Abdomen is soft. Bowel sounds are normal.   Musculoskeletal:         General: Normal range of motion.      Cervical back: Normal range of motion and neck supple.     Neurological: She is alert and oriented to person, place, and time. She has normal strength and normal reflexes. GCS score is 15. GCS eye subscore is 4. GCS verbal subscore is 5. GCS motor subscore is 6.   Skin: Skin is warm and dry. Capillary refill takes less than 2 seconds.   Psychiatric: She has a normal mood and affect. Her behavior is normal. Judgment and thought content normal.         ED Course   Procedures  Labs Reviewed   RAPID INFLUENZA A/B - Normal   THROAT SCREEN, RAPID STREP - Normal   SARS-COV-2 RNA AMPLIFICATION, QUAL - Normal          Imaging Results              X-Ray Chest PA And Lateral (Preliminary result)  Result time 02/25/24 21:50:26      Wet Read by Luis Alberto Moreno MD (02/25/24 21:50:26, [facility], Emergency Medicine)    Non acute                                     Medications   predniSONE tablet 40 mg (has no administration in time range)   azithromycin  tablet 250 mg (has no administration in time range)   benzonatate capsule 200 mg (200 mg Oral Given 2/25/24 2143)     Medical Decision Making  Amount and/or Complexity of Data Reviewed  Radiology: ordered and independent interpretation performed.    Risk  Prescription drug management.                          Medical Decision Making:   Differential Diagnosis:   Pneumonia, Influenza, Covid-19             Clinical Impression:  Final diagnoses:  [R05.9] Cough  [J20.9] Acute bronchitis, unspecified organism (Primary)          ED Disposition Condition    Discharge Stable          ED Prescriptions       Medication Sig Dispense Start Date End Date Auth. Provider    azithromycin (Z-SANA) 250 MG tablet  (Status: Discontinued) Take 2 tablets by mouth on day 1; Take 1 tablet by mouth on days 2-5 6 tablet 2/25/2024 2/25/2024 Tato Koroma FNP    benzonatate (TESSALON) 200 MG capsule  (Status: Discontinued) Take 1 capsule (200 mg total) by mouth 3 (three) times daily as needed for Cough. 30 capsule 2/25/2024 2/25/2024 Tato Koroma FNP    predniSONE (DELTASONE) 20 MG tablet Take 1 tablet (20 mg total) by mouth 2 (two) times daily. for 5 days 10 tablet 2/25/2024 3/1/2024 Tato Koroma FNP    promethazine-dextromethorphan (PROMETHAZINE-DM) 6.25-15 mg/5 mL Syrp Take 5 mLs by mouth every 4 (four) hours as needed (cough). 50 mL 2/25/2024 3/6/2024 Tato Koroma FNP    azithromycin (Z-SANA) 250 MG tablet Take 2 tablets by mouth on day 1; Take 1 tablet by mouth on days 2-5 6 tablet 2/25/2024 3/1/2024 Tato Koroma FNP          Follow-up Information       Follow up With Specialties Details Why Contact Info    Neymar Brasher NP Family Medicine Schedule an appointment as soon as possible for a visit   [address] [address]  [phone]               Tato Koroma FNP  02/25/24 2151       Tato Koroma FNP  02/25/24 2154       Tato Koroma FNP  02/25/24 2156       Tato Koroma FNP  02/25/24 2158

## 2024-03-10 ENCOUNTER — HOSPITAL ENCOUNTER (EMERGENCY)
Facility: HOSPITAL | Age: 69
Discharge: HOME | End: 2024-03-10
Attending: FAMILY MEDICINE
Payer: MEDICARE

## 2024-03-10 VITALS
BODY MASS INDEX: 41.41 KG/M2 | OXYGEN SATURATION: 98 % | HEIGHT: 62 IN | WEIGHT: 225 LBS | SYSTOLIC BLOOD PRESSURE: 154 MMHG | TEMPERATURE: 98 F | RESPIRATION RATE: 17 BRPM | HEART RATE: 87 BPM | DIASTOLIC BLOOD PRESSURE: 82 MMHG

## 2024-03-10 DIAGNOSIS — N20.0 KIDNEY STONE: ICD-10-CM

## 2024-03-10 DIAGNOSIS — K80.20 CALCULUS OF GALLBLADDER WITHOUT CHOLECYSTITIS WITHOUT OBSTRUCTION: ICD-10-CM

## 2024-03-10 DIAGNOSIS — N30.01 ACUTE CYSTITIS WITH HEMATURIA: Primary | ICD-10-CM

## 2024-03-10 DIAGNOSIS — D72.829 LEUKOCYTOSIS, UNSPECIFIED TYPE: ICD-10-CM

## 2024-03-10 LAB
ALBUMIN SERPL-MCNC: 4.1 G/DL (ref 3.4–5)
ALBUMIN/GLOB SERPL: 1.2 RATIO
ALP SERPL-CCNC: 69 UNIT/L (ref 50–144)
ALT SERPL-CCNC: 30 UNIT/L (ref 1–45)
ANION GAP SERPL CALC-SCNC: 7 MEQ/L (ref 2–13)
APPEARANCE UR: ABNORMAL
AST SERPL-CCNC: 28 UNIT/L (ref 14–36)
BACTERIA #/AREA URNS AUTO: ABNORMAL /HPF
BASOPHILS # BLD AUTO: 0.04 X10(3)/MCL (ref 0.01–0.08)
BASOPHILS NFR BLD AUTO: 0.2 % (ref 0.1–1.2)
BILIRUB SERPL-MCNC: 0.7 MG/DL (ref 0–1)
BILIRUB UR QL STRIP.AUTO: NEGATIVE
BUN SERPL-MCNC: 20 MG/DL (ref 7–20)
CALCIUM SERPL-MCNC: 9.5 MG/DL (ref 8.4–10.2)
CHLORIDE SERPL-SCNC: 102 MMOL/L (ref 98–110)
CO2 SERPL-SCNC: 29 MMOL/L (ref 21–32)
COLOR UR AUTO: YELLOW
CREAT SERPL-MCNC: 0.71 MG/DL (ref 0.66–1.25)
CREAT/UREA NIT SERPL: 28 (ref 12–20)
EOSINOPHIL # BLD AUTO: 0.1 X10(3)/MCL (ref 0.04–0.36)
EOSINOPHIL NFR BLD AUTO: 0.5 % (ref 0.7–7)
ERYTHROCYTE [DISTWIDTH] IN BLOOD BY AUTOMATED COUNT: 12.8 % (ref 11–14.5)
GFR SERPLBLD CREATININE-BSD FMLA CKD-EPI: >90 MLS/MIN/1.73/M2
GLOBULIN SER-MCNC: 3.4 GM/DL (ref 2–3.9)
GLUCOSE SERPL-MCNC: 115 MG/DL (ref 70–115)
GLUCOSE UR QL STRIP.AUTO: NEGATIVE
HCT VFR BLD AUTO: 40.9 % (ref 36–48)
HGB BLD-MCNC: 13.6 G/DL (ref 11.8–16)
IMM GRANULOCYTES # BLD AUTO: 0.08 X10(3)/MCL (ref 0–0.03)
IMM GRANULOCYTES NFR BLD AUTO: 0.4 % (ref 0–0.5)
KETONES UR QL STRIP.AUTO: NEGATIVE
LEUKOCYTE ESTERASE UR QL STRIP.AUTO: ABNORMAL
LYMPHOCYTES # BLD AUTO: 1.61 X10(3)/MCL (ref 1.16–3.74)
LYMPHOCYTES NFR BLD AUTO: 8.5 % (ref 20–55)
MCH RBC QN AUTO: 31.1 PG (ref 27–34)
MCHC RBC AUTO-ENTMCNC: 33.3 G/DL (ref 31–37)
MCV RBC AUTO: 93.4 FL (ref 79–99)
MONOCYTES # BLD AUTO: 1.24 X10(3)/MCL (ref 0.24–0.36)
MONOCYTES NFR BLD AUTO: 6.6 % (ref 4.7–12.5)
NEUTROPHILS # BLD AUTO: 15.86 X10(3)/MCL (ref 1.56–6.13)
NEUTROPHILS NFR BLD AUTO: 83.8 % (ref 37–73)
NITRITE UR QL STRIP.AUTO: POSITIVE
NRBC BLD AUTO-RTO: 0 %
PH UR STRIP.AUTO: 6.5 [PH]
PLATELET # BLD AUTO: 192 X10(3)/MCL (ref 140–371)
PMV BLD AUTO: 10.2 FL (ref 9.4–12.4)
POTASSIUM SERPL-SCNC: 4.3 MMOL/L (ref 3.5–5.1)
PROT SERPL-MCNC: 7.5 GM/DL (ref 6.3–8.2)
PROT UR QL STRIP.AUTO: 100
RBC # BLD AUTO: 4.38 X10(6)/MCL (ref 4–5.1)
RBC #/AREA URNS AUTO: >100 /HPF
RBC UR QL AUTO: ABNORMAL
SODIUM SERPL-SCNC: 138 MMOL/L (ref 135–145)
SP GR UR STRIP.AUTO: 1.02 (ref 1–1.03)
UROBILINOGEN UR STRIP-ACNC: 0.2
WBC # SPEC AUTO: 18.93 X10(3)/MCL (ref 4–11.5)
WBC #/AREA URNS AUTO: >100 /HPF

## 2024-03-10 PROCEDURE — 87086 URINE CULTURE/COLONY COUNT: CPT | Performed by: NURSE PRACTITIONER

## 2024-03-10 PROCEDURE — 99284 EMERGENCY DEPT VISIT MOD MDM: CPT | Mod: 25

## 2024-03-10 PROCEDURE — 96360 HYDRATION IV INFUSION INIT: CPT

## 2024-03-10 PROCEDURE — 81003 URINALYSIS AUTO W/O SCOPE: CPT | Performed by: NURSE PRACTITIONER

## 2024-03-10 PROCEDURE — 85025 COMPLETE CBC W/AUTO DIFF WBC: CPT | Performed by: NURSE PRACTITIONER

## 2024-03-10 PROCEDURE — 80053 COMPREHEN METABOLIC PANEL: CPT | Performed by: NURSE PRACTITIONER

## 2024-03-10 PROCEDURE — 25000003 PHARM REV CODE 250: Performed by: NURSE PRACTITIONER

## 2024-03-10 RX ORDER — TAMSULOSIN HYDROCHLORIDE 0.4 MG/1
0.4 CAPSULE ORAL
Status: COMPLETED | OUTPATIENT
Start: 2024-03-10 | End: 2024-03-10

## 2024-03-10 RX ORDER — PHENAZOPYRIDINE HYDROCHLORIDE 200 MG/1
200 TABLET, FILM COATED ORAL 3 TIMES DAILY
Qty: 6 TABLET | Refills: 0 | Status: SHIPPED | OUTPATIENT
Start: 2024-03-10 | End: 2024-03-12

## 2024-03-10 RX ORDER — ONDANSETRON 4 MG/1
8 TABLET, ORALLY DISINTEGRATING ORAL
Status: COMPLETED | OUTPATIENT
Start: 2024-03-10 | End: 2024-03-10

## 2024-03-10 RX ORDER — HYDROCODONE BITARTRATE AND ACETAMINOPHEN 7.5; 325 MG/1; MG/1
1 TABLET ORAL EVERY 6 HOURS PRN
Qty: 12 TABLET | Refills: 0 | Status: SHIPPED | OUTPATIENT
Start: 2024-03-10

## 2024-03-10 RX ORDER — TAMSULOSIN HYDROCHLORIDE 0.4 MG/1
0.4 CAPSULE ORAL DAILY
Qty: 10 CAPSULE | Refills: 0 | Status: SHIPPED | OUTPATIENT
Start: 2024-03-10 | End: 2024-03-20

## 2024-03-10 RX ORDER — ONDANSETRON 4 MG/1
4 TABLET, FILM COATED ORAL EVERY 6 HOURS PRN
Qty: 12 TABLET | Refills: 0 | Status: SHIPPED | OUTPATIENT
Start: 2024-03-10

## 2024-03-10 RX ORDER — HYDROCODONE BITARTRATE AND ACETAMINOPHEN 5; 325 MG/1; MG/1
1 TABLET ORAL
Status: COMPLETED | OUTPATIENT
Start: 2024-03-10 | End: 2024-03-10

## 2024-03-10 RX ORDER — CIPROFLOXACIN 500 MG/1
500 TABLET ORAL 2 TIMES DAILY
Qty: 14 TABLET | Refills: 0 | Status: SHIPPED | OUTPATIENT
Start: 2024-03-10 | End: 2024-03-17

## 2024-03-10 RX ORDER — CIPROFLOXACIN 500 MG/1
500 TABLET ORAL
Status: COMPLETED | OUTPATIENT
Start: 2024-03-10 | End: 2024-03-10

## 2024-03-10 RX ADMIN — HYDROCODONE BITARTRATE AND ACETAMINOPHEN 1 TABLET: 5; 325 TABLET ORAL at 04:03

## 2024-03-10 RX ADMIN — TAMSULOSIN HYDROCHLORIDE 0.4 MG: 0.4 CAPSULE ORAL at 04:03

## 2024-03-10 RX ADMIN — SODIUM CHLORIDE 1000 ML: 9 INJECTION, SOLUTION INTRAVENOUS at 03:03

## 2024-03-10 RX ADMIN — ONDANSETRON 8 MG: 4 TABLET, ORALLY DISINTEGRATING ORAL at 01:03

## 2024-03-10 RX ADMIN — CIPROFLOXACIN 500 MG: 500 TABLET, FILM COATED ORAL at 02:03

## 2024-03-10 NOTE — DISCHARGE INSTRUCTIONS
Return to emergency department if you start to have increased abdominal pain, nausea, vomiting or any other worsening of signs and symptoms.

## 2024-03-10 NOTE — ED PROVIDER NOTES
"Encounter Date: 3/10/2024       History     Chief Complaint   Patient presents with    Flank Pain    PELVIC PRESSURE     Pt reports "Every time I need to pee I feel like everythings going to fall out and my lower back hurts so bad. The is so much pressure here. I had some burning when I was peeing but I started drinking cranberry juice and it went away." Pt capillary refill <3. Pt acyanotic. No acute distress noted.       69 yrs old female presents with lower back pain, burning with urination and the feeling like everything is going to fall out and she has a lot of pressure to pelvic area. Patient reports she also has nausea and the burning with urination went away after taking some AZO.     The history is provided by the patient.     Review of patient's allergies indicates:   Allergen Reactions    Kassandra [amlodipine-olmesartan]     Pcn [penicillins]     Beta-blockers (beta-adrenergic blocking agts) Nausea And Vomiting    Sulfa (sulfonamide antibiotics) Nausea And Vomiting     Past Medical History:   Diagnosis Date    Arthritis     Hypertension     Other specified disorders of bladder      Past Surgical History:   Procedure Laterality Date    COLON SURGERY      HYSTERECTOMY      TONSILLECTOMY       History reviewed. No pertinent family history.  Social History     Tobacco Use    Smoking status: Never    Smokeless tobacco: Never   Substance Use Topics    Alcohol use: Not Currently    Drug use: Never     Review of Systems   Respiratory:  Negative for apnea, cough, choking, chest tightness, shortness of breath, wheezing and stridor.    Cardiovascular:  Negative for chest pain, palpitations and leg swelling.   Gastrointestinal:  Positive for abdominal pain and nausea. Negative for constipation, diarrhea, rectal pain and vomiting.   Genitourinary:  Positive for dysuria, flank pain and pelvic pain.   Musculoskeletal:  Positive for back pain. Negative for arthralgias, gait problem, joint swelling, myalgias, neck pain and " neck stiffness.   All other systems reviewed and are negative.      Physical Exam     Initial Vitals [03/10/24 1213]   BP Pulse Resp Temp SpO2   (!) 132/96 78 18 98.2 °F (36.8 °C) 98 %      MAP       --         Physical Exam    Nursing note and vitals reviewed.  Constitutional: She appears well-developed and well-nourished.   HENT:   Head: Normocephalic and atraumatic.   Right Ear: External ear normal.   Left Ear: External ear normal.   Nose: Nose normal.   Mouth/Throat: Oropharynx is clear and moist.   Eyes: Conjunctivae and EOM are normal.   Neck: Neck supple.   Normal range of motion.  Cardiovascular:  Normal rate, regular rhythm, normal heart sounds and intact distal pulses.           Pulmonary/Chest: Breath sounds normal.   Abdominal: Abdomen is soft. Bowel sounds are normal. There is abdominal tenderness in the suprapubic area. No hernia.   There is right CVA tenderness.  There is left CVA tenderness. There is no rebound and no guarding.   Musculoskeletal:         General: Normal range of motion.      Cervical back: Normal range of motion and neck supple.     Neurological: She is alert and oriented to person, place, and time. She has normal strength and normal reflexes. GCS score is 15. GCS eye subscore is 4. GCS verbal subscore is 5. GCS motor subscore is 6.   Skin: Skin is warm and dry. Capillary refill takes less than 2 seconds.   Psychiatric: She has a normal mood and affect. Her behavior is normal. Judgment and thought content normal.         ED Course   Procedures  Labs Reviewed   COMPREHENSIVE METABOLIC PANEL - Abnormal; Notable for the following components:       Result Value    BUN/Creatinine Ratio 28 (*)     All other components within normal limits   URINALYSIS, REFLEX TO URINE CULTURE - Abnormal; Notable for the following components:    Appearance, UA Turbid (*)     Protein,  (*)     Blood, UA Large (*)     Nitrites, UA Positive (*)     Leukocyte Esterase, UA Moderate (*)     All other  components within normal limits    Narrative:      URINE STABILITY IS 2 HOURS AT ROOM TEMP OR    SIX HOURS REFRIGERATED. PERFORMING TESTING ON    SPECIMENS GREATER THAN THIS AGE MAY AFFECT THE    FOLLOWING TESTS:    PH          SPECIFIC GRAVITY           BLOOD    CLARITY     BILIRUBIN               UROBILINOGEN   CBC WITH DIFFERENTIAL - Abnormal; Notable for the following components:    WBC 18.93 (*)     Neut % 83.8 (*)     Lymph % 8.5 (*)     Eos % 0.5 (*)     Neut # 15.86 (*)     Mono # 1.24 (*)     IG# 0.08 (*)     All other components within normal limits   URINALYSIS, MICROSCOPIC - Abnormal; Notable for the following components:    Bacteria, UA Many (*)     RBC, UA >100 (*)     WBC, UA >100 (*)     All other components within normal limits   CULTURE, URINE   CBC W/ AUTO DIFFERENTIAL    Narrative:     The following orders were created for panel order CBC auto differential.  Procedure                               Abnormality         Status                     ---------                               -----------         ------                     CBC with Differential[[id number]]       Abnormal            Final result                 Please view results for these tests on the individual orders.          Imaging Results              US Abdomen Limited_Gallbladder (Final result)  Result time 03/10/24 15:51:15   Procedure changed from US Abdomen Limited     Final result by Sven Rogel MD (03/10/24 15:51:15)                   Impression:      Cholelithiasis without sonographic evidence for cholecystitis.      Electronically signed by: Sven Rogel MD  Date:    03/10/2024  Time:    15:51               Narrative:    EXAMINATION:  US ABDOMEN LIMITED_GALLBLADDER    CLINICAL HISTORY:  gallbladder;    TECHNIQUE:  Multiple sonographic images of the right upper quadrant obtained by department sonographer.    COMPARISON:  None    FINDINGS:  Liver capsule is smooth with normal echogenicity of the parenchyma.  No evidence for  focal hepatic lesion.  No intrahepatic duct dilatation.  The portal vein is patent with hepatopetal flow.  Gallbladder is present without evidence for pericholecystic fluid, gallbladder wall thickening, or sonographic Guerra sign.  Cholelithiasis is identified.  Common duct measures 6 mm.                                       CT Abdomen Pelvis  Without Contrast (Final result)  Result time 03/10/24 14:30:41      Final result by Sven Rogel MD (03/10/24 14:30:41)                   Impression:      1. Minimal hydronephrosis and hydroureter on the left with an obstructing 4 mm calculus cranial to the UVJ.  2. Concern for tensile gallbladder wall sign and impacted gallstone at the gallbladder neck.  3. Other secondary findings as noted.      Electronically signed by: Sven Rogel MD  Date:    03/10/2024  Time:    14:30               Narrative:    EXAMINATION:  CT ABDOMEN PELVIS WITHOUT CONTRAST    CLINICAL HISTORY:  Abdominal pain, acute, nonlocalized;    TECHNIQUE:  Multiple cross-sectional images were obtained from the lung bases the pubic symphysis without the use of contrast.  Coronal and sagittal reformatted images were obtained.  An automated dose exposure technique was utilized this limits radiation does the patient.    COMPARISON:  None    FINDINGS:  Dependent atelectatic changes lungs.  Heart size within normal limits with coronary calcifications.    The evaluation of hollow and solid viscera is limited secondary to technique.    The liver is hypodense consistent hepatic steatosis.  The spleen, adrenals, and pancreas are normal.  Gallbladder appears to be hydropic with questionable impacted gallstone in the neck without evidence for gallbladder wall thickening.  Questionable tensile gallbladder wall sign is noted.  Kidneys are symmetric in size with minimal hydronephrosis on the left with hydroureter and inflammatory changes.  Just cranial to the UVJ 4 mm calculus is identified.    Small hiatal hernia.  Small  bowel is of normal caliber.  Colon is also normal caliber with scattered colonic diverticula.  No adjacent inflammatory changes.  Normal appendix.    The uterus is surgically absent.  Pelvic floor relaxation is identified.  Bladder is under distended and normal.  Course and caliber of the abdominal is normal with scattered calcified atheromatous disease.    No suspicious osseous lesions.  Spondylotic changes are identified.  The soft tissues are grossly normal.                                       Medications   tamsulosin 24 hr capsule 0.4 mg (has no administration in time range)   ondansetron disintegrating tablet 8 mg (8 mg Oral Given 3/10/24 1347)   ciprofloxacin HCl tablet 500 mg (500 mg Oral Given 3/10/24 1407)   sodium chloride 0.9% bolus 1,000 mL 1,000 mL (1,000 mLs Intravenous New Bag 3/10/24 1511)   HYDROcodone-acetaminophen 5-325 mg per tablet 1 tablet (1 tablet Oral Given 3/10/24 1617)     Medical Decision Making  69 yrs old female presents with lower back pain, burning with urination and the feeling like everything is going to fall out and she has a lot of pressure to pelvic area. Patient reports she also has nausea and the burning with urination went away after taking some AZO. Reviewed case with Dr. Quinones who reports to give patient IV fluids and 1 dose of antibiotics now. Dr. Quinones reviewed Ct report and instructed me to obtain an ultrasound of gallbladder. Dr. Quinones reviewed ultrasound report. Dr. Quinones reports patient can be discharged with Flomax and pain medication, antibiotics, Zofran and pyridium with close follow up with primary care provider and strict return precautions. Patient reports nausea has resolved, but she continues to have lower pelvic pain. Instructed patient to return to emergency department for increase in abdominal pain, nausea, or vomiting or worsening of symptoms. Patient verbalized understanding and agrees with plan.         Amount and/or Complexity of Data  Reviewed  Labs: ordered.  Radiology: ordered.    Risk  Prescription drug management.  Risk Details: Cipro as directed. Pyridium as directed. Follow up with primary care provider in 1-2 days for recheck.                          Medical Decision Making:   Differential Diagnosis:   Cholecystitis, Gastroenteritis, Pyelonephritis, Appendicitis             Clinical Impression:  Final diagnoses:  [N30.01] Acute cystitis with hematuria (Primary)  [N20.0] Kidney stone  [K80.20] Calculus of gallbladder without cholecystitis without obstruction  [D72.829] Leukocytosis, unspecified type          ED Disposition Condition    Discharge Stable          ED Prescriptions       Medication Sig Dispense Start Date End Date Auth. Provider    phenazopyridine (PYRIDIUM) 200 MG tablet Take 1 tablet (200 mg total) by mouth 3 (three) times daily. for 2 days 6 tablet 3/10/2024 3/12/2024 Tato Koroma FNP    ciprofloxacin HCl (CIPRO) 500 MG tablet Take 1 tablet (500 mg total) by mouth 2 (two) times daily. for 7 days 14 tablet 3/10/2024 3/17/2024 Tato Koroma FNP    tamsulosin (FLOMAX) 0.4 mg Cap Take 1 capsule (0.4 mg total) by mouth once daily. for 10 days 10 capsule 3/10/2024 3/20/2024 Tato Koroma FNP    HYDROcodone-acetaminophen (NORCO) 7.5-325 mg per tablet Take 1 tablet by mouth every 6 (six) hours as needed for Pain. 12 tablet 3/10/2024 -- Tato Koroma FNP    ondansetron (ZOFRAN) 4 MG tablet Take 1 tablet (4 mg total) by mouth every 6 (six) hours as needed for Nausea. 12 tablet 3/10/2024 -- Tato Koroma FNP          Follow-up Information       Follow up With Specialties Details Why Contact Info    Neymar Brasher NP Family Medicine Schedule an appointment as soon as possible for a visit   [address] [place] [address]  [phone]               Tato Koroma FNP  03/10/24 1604       Tato Koroma FNP  03/10/24 1607       Tato Koroma FNP  03/10/24 1616       Tato Koroma FNP  03/10/24 1616

## 2024-03-13 LAB — BACTERIA UR CULT: ABNORMAL

## 2024-03-18 ENCOUNTER — HOSPITAL ENCOUNTER (OUTPATIENT)
Dept: RADIOLOGY | Facility: HOSPITAL | Age: 69
Discharge: HOME | End: 2024-03-18
Attending: NURSE PRACTITIONER
Payer: MEDICARE

## 2024-03-18 DIAGNOSIS — R05.9 COUGH: ICD-10-CM

## 2024-03-18 PROCEDURE — 71046 X-RAY EXAM CHEST 2 VIEWS: CPT | Mod: TC

## 2024-03-20 ENCOUNTER — HOSPITAL ENCOUNTER (EMERGENCY)
Facility: HOSPITAL | Age: 69
Discharge: HOME | End: 2024-03-20
Payer: MEDICARE

## 2024-03-20 VITALS
DIASTOLIC BLOOD PRESSURE: 74 MMHG | HEART RATE: 82 BPM | WEIGHT: 225 LBS | SYSTOLIC BLOOD PRESSURE: 161 MMHG | HEIGHT: 62 IN | TEMPERATURE: 98 F | BODY MASS INDEX: 41.41 KG/M2 | OXYGEN SATURATION: 96 % | RESPIRATION RATE: 18 BRPM

## 2024-03-20 DIAGNOSIS — K56.41 FECAL IMPACTION IN RECTUM: Primary | ICD-10-CM

## 2024-03-20 DIAGNOSIS — K59.00 CONSTIPATION: ICD-10-CM

## 2024-03-20 PROCEDURE — 99283 EMERGENCY DEPT VISIT LOW MDM: CPT | Mod: 25

## 2024-03-20 PROCEDURE — 25000003 PHARM REV CODE 250

## 2024-03-20 RX ORDER — POLYETHYLENE GLYCOL 3350 17 G/17G
17 POWDER, FOR SOLUTION ORAL DAILY
Qty: 510 G | Refills: 0 | Status: SHIPPED | OUTPATIENT
Start: 2024-03-20 | End: 2024-04-19

## 2024-03-20 RX ORDER — LACTULOSE 10 G/15ML
30 SOLUTION ORAL
Status: COMPLETED | OUTPATIENT
Start: 2024-03-20 | End: 2024-03-20

## 2024-03-20 RX ADMIN — LACTULOSE 30 G: 20 SOLUTION ORAL at 10:03

## 2024-03-20 RX ADMIN — Medication 1 ENEMA: at 10:03

## 2024-03-21 NOTE — ED NOTES
Fleet enema given pt tolerated well laying on left side. Parts of stool came around fleets when pushing fluid in.

## 2024-03-21 NOTE — ED PROVIDER NOTES
"Encounter Date: 3/20/2024       History     Chief Complaint   Patient presents with    Constipation     Pt states "I'm feeling pressure in my rectum. I haven't had a bowel movement since Monday." Reports seeing PCP Sonny Gonzalez on Monday and did not get anything for relief. Denies minimal relief w/ OTC medications.      69-year-old female presents complaining of constipation.  She has not had a bowel movement in the last 3 days.  She has a feeling of fullness in her rectum.    The history is provided by the patient.     Review of patient's allergies indicates:   Allergen Reactions    Kassandra [amlodipine-olmesartan]     Pcn [penicillins]     Beta-blockers (beta-adrenergic blocking agts) Nausea And Vomiting    Sulfa (sulfonamide antibiotics) Nausea And Vomiting     Past Medical History:   Diagnosis Date    Arthritis     Hypertension     Other specified disorders of bladder      Past Surgical History:   Procedure Laterality Date    COLON SURGERY      HYSTERECTOMY      TONSILLECTOMY       History reviewed. No pertinent family history.  Social History     Tobacco Use    Smoking status: Never    Smokeless tobacco: Never   Substance Use Topics    Alcohol use: Not Currently    Drug use: Never     Review of Systems   Constitutional:  Negative for fever.   HENT:  Negative for sore throat.    Respiratory:  Negative for shortness of breath.    Cardiovascular:  Negative for chest pain.   Gastrointestinal:  Positive for constipation. Negative for abdominal distention, abdominal pain, nausea and vomiting.   Genitourinary:  Negative for dysuria.   Musculoskeletal:  Negative for back pain.   Skin:  Negative for rash.   Neurological:  Negative for weakness.   Hematological:  Does not bruise/bleed easily.   All other systems reviewed and are negative.      Physical Exam     Initial Vitals [03/20/24 2122]   BP Pulse Resp Temp SpO2   (!) 128/59 90 18 98 °F (36.7 °C) 96 %      MAP       --         Physical Exam    Nursing note and vitals " reviewed.  Constitutional: Vital signs are normal. She appears well-developed and well-nourished. She is cooperative.   HENT:   Head: Normocephalic and atraumatic.   Mouth/Throat: Oropharynx is clear and moist.   Eyes: Conjunctivae, EOM and lids are normal. Pupils are equal, round, and reactive to light.   Neck: Trachea normal. Neck supple.   Normal range of motion.  Cardiovascular:  Normal rate, regular rhythm, normal heart sounds and intact distal pulses.           Pulmonary/Chest: Breath sounds normal.   Abdominal: Abdomen is soft. Bowel sounds are normal. She exhibits no distension and no mass. There is no abdominal tenderness. There is no rebound and no guarding.   Genitourinary:    Genitourinary Comments: She has a fecal impaction     Musculoskeletal:         General: Normal range of motion.      Cervical back: Normal, normal range of motion and neck supple.      Lumbar back: Normal.     Neurological: She is alert and oriented to person, place, and time. She has normal strength. Coordination normal. GCS score is 15. GCS eye subscore is 4. GCS verbal subscore is 5. GCS motor subscore is 6.   Skin: Skin is warm, dry and intact. Capillary refill takes less than 2 seconds.   Psychiatric: She has a normal mood and affect. Her speech is normal and behavior is normal. Judgment and thought content normal. Cognition and memory are normal.         ED Course   Procedures  Labs Reviewed - No data to display       Imaging Results              X-Ray Abdomen AP 1 View (KUB) (Preliminary result)  Result time 03/20/24 21:40:45      Wet Read by Urban Martin MD (03/20/24 21:40:45, [facility], Emergency Medicine)    Nonspecific bowel gas pattern, no obstruction, feces in rectum.                                     Medications   lactulose 20 gram/30 mL solution Soln 30 g (30 g Oral Given 3/20/24 2200)   sodium phosphates 19-7 gram/118 mL enema 1 enema (1 enema Rectal Given 3/20/24 2203)      Medical Decision Making  Constipation  Differential diagnosis:  Fecal impaction, functional constipation, bowel obstruction  KUB  Lactulose  Digital disimpaction, enema    Amount and/or Complexity of Data Reviewed  Radiology: ordered and independent interpretation performed.    Risk  OTC drugs.  Prescription drug management.                                      Clinical Impression:  Final diagnoses:  [K59.00] Constipation  [K56.41] Fecal impaction in rectum (Primary)          ED Disposition Condition    Discharge Good          ED Prescriptions       Medication Sig Dispense Start Date End Date Auth. Provider    polyethylene glycol (GLYCOLAX) 17 gram/dose powder Take 17 g by mouth once daily. 510 g 3/20/2024 4/19/2024 Urban Martin MD          Follow-up Information       Follow up With Specialties Details Why Contact Info    Neymar Brasher NP Family Medicine Call in 1 day  [address] [id number] [phone]               Urban Martin MD  03/20/24 5175

## 2024-04-22 ENCOUNTER — HOSPITAL ENCOUNTER (OUTPATIENT)
Dept: RADIOLOGY | Facility: HOSPITAL | Age: 69
Discharge: HOME | End: 2024-04-22
Attending: NURSE PRACTITIONER
Payer: MEDICARE

## 2024-04-22 DIAGNOSIS — M54.50 LOW BACK PAIN: ICD-10-CM

## 2024-04-22 DIAGNOSIS — M54.31 RIGHT SIDED SCIATICA: ICD-10-CM

## 2024-04-22 PROCEDURE — 72100 X-RAY EXAM L-S SPINE 2/3 VWS: CPT | Mod: TC

## 2024-04-25 DIAGNOSIS — R05.9 COUGH: Primary | ICD-10-CM

## 2024-10-16 ENCOUNTER — HOSPITAL ENCOUNTER (OUTPATIENT)
Dept: RADIOLOGY | Facility: HOSPITAL | Age: 69
Discharge: HOME | End: 2024-10-16
Attending: NURSE PRACTITIONER
Payer: MEDICARE

## 2024-10-16 DIAGNOSIS — M25.512 LEFT SHOULDER PAIN: ICD-10-CM

## 2024-10-16 PROCEDURE — 73030 X-RAY EXAM OF SHOULDER: CPT | Mod: TC,LT

## 2024-11-19 DIAGNOSIS — M79.604 RIGHT LEG PAIN: Primary | ICD-10-CM

## 2024-11-20 ENCOUNTER — HOSPITAL ENCOUNTER (OUTPATIENT)
Dept: RADIOLOGY | Facility: HOSPITAL | Age: 69
Discharge: HOME | End: 2024-11-20
Attending: NURSE PRACTITIONER
Payer: MEDICARE

## 2024-11-20 DIAGNOSIS — M79.604 RIGHT LEG PAIN: ICD-10-CM

## 2024-11-20 PROCEDURE — 93970 EXTREMITY STUDY: CPT | Mod: TC

## 2025-01-14 DIAGNOSIS — I10 ESSENTIAL (PRIMARY) HYPERTENSION: ICD-10-CM

## 2025-01-14 RX ORDER — LISINOPRIL AND HYDROCHLOROTHIAZIDE 12.5; 2 MG/1; MG/1
1 TABLET ORAL 2 TIMES DAILY
Qty: 180 TABLET | Refills: 3 | OUTPATIENT
Start: 2025-01-14